# Patient Record
Sex: FEMALE | Race: WHITE | NOT HISPANIC OR LATINO | Employment: UNEMPLOYED
[De-identification: names, ages, dates, MRNs, and addresses within clinical notes are randomized per-mention and may not be internally consistent; named-entity substitution may affect disease eponyms.]

---

## 2017-05-26 ENCOUNTER — HEALTH MAINTENANCE LETTER (OUTPATIENT)
Age: 32
End: 2017-05-26

## 2023-12-05 ENCOUNTER — APPOINTMENT (OUTPATIENT)
Dept: CT IMAGING | Facility: CLINIC | Age: 38
End: 2023-12-05
Attending: PHYSICIAN ASSISTANT
Payer: COMMERCIAL

## 2023-12-05 ENCOUNTER — APPOINTMENT (OUTPATIENT)
Dept: ULTRASOUND IMAGING | Facility: CLINIC | Age: 38
End: 2023-12-05
Attending: PHYSICIAN ASSISTANT
Payer: COMMERCIAL

## 2023-12-05 ENCOUNTER — HOSPITAL ENCOUNTER (EMERGENCY)
Facility: CLINIC | Age: 38
Discharge: HOME | End: 2023-12-05
Attending: EMERGENCY MEDICINE | Admitting: EMERGENCY MEDICINE
Payer: COMMERCIAL

## 2023-12-05 VITALS
DIASTOLIC BLOOD PRESSURE: 57 MMHG | RESPIRATION RATE: 16 BRPM | HEART RATE: 62 BPM | OXYGEN SATURATION: 100 % | TEMPERATURE: 98.3 F | SYSTOLIC BLOOD PRESSURE: 105 MMHG

## 2023-12-05 DIAGNOSIS — R82.81 PYURIA: ICD-10-CM

## 2023-12-05 DIAGNOSIS — R10.9 ABDOMINAL PAIN, UNSPECIFIED ABDOMINAL LOCATION: ICD-10-CM

## 2023-12-05 LAB
ALBUMIN SERPL BCG-MCNC: 3.9 G/DL (ref 3.5–5.2)
ALBUMIN UR-MCNC: 30 MG/DL
ALP SERPL-CCNC: 53 U/L (ref 40–150)
ALT SERPL W P-5'-P-CCNC: 10 U/L (ref 0–50)
ANION GAP SERPL CALCULATED.3IONS-SCNC: 7 MMOL/L (ref 7–15)
APPEARANCE UR: CLEAR
AST SERPL W P-5'-P-CCNC: 15 U/L (ref 0–45)
BASOPHILS # BLD AUTO: 0.1 10E3/UL (ref 0–0.2)
BASOPHILS NFR BLD AUTO: 1 %
BILIRUB SERPL-MCNC: 0.2 MG/DL
BILIRUB UR QL STRIP: NEGATIVE
BUN SERPL-MCNC: 10.7 MG/DL (ref 6–20)
CALCIUM SERPL-MCNC: 8.5 MG/DL (ref 8.6–10)
CHLORIDE SERPL-SCNC: 101 MMOL/L (ref 98–107)
CLUE CELLS: ABNORMAL
COLOR UR AUTO: ABNORMAL
CREAT SERPL-MCNC: 0.68 MG/DL (ref 0.51–0.95)
DEPRECATED HCO3 PLAS-SCNC: 28 MMOL/L (ref 22–29)
EGFRCR SERPLBLD CKD-EPI 2021: >90 ML/MIN/1.73M2
EOSINOPHIL # BLD AUTO: 0 10E3/UL (ref 0–0.7)
EOSINOPHIL NFR BLD AUTO: 1 %
ERYTHROCYTE [DISTWIDTH] IN BLOOD BY AUTOMATED COUNT: 17 % (ref 10–15)
GLUCOSE SERPL-MCNC: 87 MG/DL (ref 70–99)
GLUCOSE UR STRIP-MCNC: NEGATIVE MG/DL
HCG INTACT+B SERPL-ACNC: <1 MIU/ML
HCT VFR BLD AUTO: 33.9 % (ref 35–47)
HGB BLD-MCNC: 10.4 G/DL (ref 11.7–15.7)
HGB UR QL STRIP: ABNORMAL
IMM GRANULOCYTES # BLD: 0 10E3/UL
IMM GRANULOCYTES NFR BLD: 0 %
KETONES UR STRIP-MCNC: 10 MG/DL
LEUKOCYTE ESTERASE UR QL STRIP: ABNORMAL
LIPASE SERPL-CCNC: 30 U/L (ref 13–60)
LYMPHOCYTES # BLD AUTO: 1.3 10E3/UL (ref 0.8–5.3)
LYMPHOCYTES NFR BLD AUTO: 22 %
MCH RBC QN AUTO: 26.5 PG (ref 26.5–33)
MCHC RBC AUTO-ENTMCNC: 30.7 G/DL (ref 31.5–36.5)
MCV RBC AUTO: 86 FL (ref 78–100)
MONOCYTES # BLD AUTO: 0.4 10E3/UL (ref 0–1.3)
MONOCYTES NFR BLD AUTO: 7 %
MUCOUS THREADS #/AREA URNS LPF: PRESENT /LPF
NEUTROPHILS # BLD AUTO: 4.2 10E3/UL (ref 1.6–8.3)
NEUTROPHILS NFR BLD AUTO: 69 %
NITRATE UR QL: NEGATIVE
NRBC # BLD AUTO: 0 10E3/UL
NRBC BLD AUTO-RTO: 0 /100
PH UR STRIP: 6 [PH] (ref 5–7)
PLATELET # BLD AUTO: 328 10E3/UL (ref 150–450)
POTASSIUM SERPL-SCNC: 3.5 MMOL/L (ref 3.4–5.3)
PROT SERPL-MCNC: 6.2 G/DL (ref 6.4–8.3)
RBC # BLD AUTO: 3.93 10E6/UL (ref 3.8–5.2)
RBC URINE: 3 /HPF
SODIUM SERPL-SCNC: 136 MMOL/L (ref 135–145)
SP GR UR STRIP: 1.03 (ref 1–1.03)
SQUAMOUS EPITHELIAL: <1 /HPF
TRANSITIONAL EPI: <1 /HPF
TRICHOMONAS, WET PREP: ABNORMAL
UROBILINOGEN UR STRIP-MCNC: NORMAL MG/DL
WBC # BLD AUTO: 6 10E3/UL (ref 4–11)
WBC URINE: 83 /HPF
WBC'S/HIGH POWER FIELD, WET PREP: ABNORMAL
YEAST, WET PREP: ABNORMAL

## 2023-12-05 PROCEDURE — 74177 CT ABD & PELVIS W/CONTRAST: CPT

## 2023-12-05 PROCEDURE — 87591 N.GONORRHOEAE DNA AMP PROB: CPT | Performed by: PHYSICIAN ASSISTANT

## 2023-12-05 PROCEDURE — 81001 URINALYSIS AUTO W/SCOPE: CPT | Performed by: PHYSICIAN ASSISTANT

## 2023-12-05 PROCEDURE — 87210 SMEAR WET MOUNT SALINE/INK: CPT | Performed by: PHYSICIAN ASSISTANT

## 2023-12-05 PROCEDURE — 250N000009 HC RX 250: Performed by: EMERGENCY MEDICINE

## 2023-12-05 PROCEDURE — 99285 EMERGENCY DEPT VISIT HI MDM: CPT | Mod: 25 | Performed by: EMERGENCY MEDICINE

## 2023-12-05 PROCEDURE — 250N000011 HC RX IP 250 OP 636: Mod: JZ | Performed by: EMERGENCY MEDICINE

## 2023-12-05 PROCEDURE — 87491 CHLMYD TRACH DNA AMP PROBE: CPT | Performed by: PHYSICIAN ASSISTANT

## 2023-12-05 PROCEDURE — 87086 URINE CULTURE/COLONY COUNT: CPT | Performed by: PHYSICIAN ASSISTANT

## 2023-12-05 PROCEDURE — 76830 TRANSVAGINAL US NON-OB: CPT

## 2023-12-05 PROCEDURE — 84702 CHORIONIC GONADOTROPIN TEST: CPT | Performed by: PHYSICIAN ASSISTANT

## 2023-12-05 PROCEDURE — 99284 EMERGENCY DEPT VISIT MOD MDM: CPT | Performed by: EMERGENCY MEDICINE

## 2023-12-05 PROCEDURE — 36415 COLL VENOUS BLD VENIPUNCTURE: CPT | Performed by: PHYSICIAN ASSISTANT

## 2023-12-05 PROCEDURE — 85025 COMPLETE CBC W/AUTO DIFF WBC: CPT | Performed by: PHYSICIAN ASSISTANT

## 2023-12-05 PROCEDURE — 83690 ASSAY OF LIPASE: CPT | Performed by: PHYSICIAN ASSISTANT

## 2023-12-05 PROCEDURE — 250N000013 HC RX MED GY IP 250 OP 250 PS 637: Performed by: PHYSICIAN ASSISTANT

## 2023-12-05 PROCEDURE — 80053 COMPREHEN METABOLIC PANEL: CPT | Performed by: PHYSICIAN ASSISTANT

## 2023-12-05 RX ORDER — ACETAMINOPHEN 500 MG
1000 TABLET ORAL ONCE
Status: COMPLETED | OUTPATIENT
Start: 2023-12-05 | End: 2023-12-05

## 2023-12-05 RX ORDER — IOPAMIDOL 755 MG/ML
100 INJECTION, SOLUTION INTRAVASCULAR ONCE
Status: COMPLETED | OUTPATIENT
Start: 2023-12-05 | End: 2023-12-05

## 2023-12-05 RX ORDER — OXYCODONE HYDROCHLORIDE 5 MG/1
5 TABLET ORAL ONCE
Status: COMPLETED | OUTPATIENT
Start: 2023-12-05 | End: 2023-12-05

## 2023-12-05 RX ORDER — ONDANSETRON 4 MG/1
4 TABLET, ORALLY DISINTEGRATING ORAL EVERY 8 HOURS PRN
Qty: 10 TABLET | Refills: 0 | Status: SHIPPED | OUTPATIENT
Start: 2023-12-05 | End: 2023-12-08

## 2023-12-05 RX ADMIN — SODIUM CHLORIDE 38 ML: 9 INJECTION, SOLUTION INTRAVENOUS at 22:23

## 2023-12-05 RX ADMIN — OXYCODONE HYDROCHLORIDE 5 MG: 5 TABLET ORAL at 21:35

## 2023-12-05 RX ADMIN — ACETAMINOPHEN 1000 MG: 500 TABLET ORAL at 18:48

## 2023-12-05 RX ADMIN — IOPAMIDOL 78 ML: 755 INJECTION, SOLUTION INTRAVENOUS at 22:25

## 2023-12-05 ASSESSMENT — ACTIVITIES OF DAILY LIVING (ADL)
ADLS_ACUITY_SCORE: 35
ADLS_ACUITY_SCORE: 33
ADLS_ACUITY_SCORE: 35
ADLS_ACUITY_SCORE: 35

## 2023-12-05 NOTE — ED PROVIDER NOTES
ED Provider Note  [facility]      History     Chief Complaint   Patient presents with    Abdominal Pain     Ongoing abdominal pain, radiating to lower back, worse today, irregular periods, nausea and vomiting, weakness and fatigue.     HPI  37yo F pmhx  Bipolar personality disorder, PTSD, Bipolar 1, Depression, Fibromyalgia, s/p Kerri-en-Y  p/w ongoing abdominal pain x months  Low abdominal pain radiates to her low back, and into her vagina.  Associated with mild nausea, but no vomiting.  Denies fever or chills.  Currently on menses.  No vaginal discharge, urinary symptoms, diarrhea.  Notably patient saw her PCP for the same symptoms within the last week.  Had UA that was negative.  Has outpatient pelvic sono pending. Outpt notes indicate pt has chronic history of dysmenorrhea and fibromyalgia.  Additionally, patient had ED visits in the middle of November, had a Noncon CT of her abdomen pelvis at that time which was grossly unremarkable, save for noted gastric bypass and cholecystectomy.  Additionally, has completed a course of metronidazole nitrofurantoin within the last month for UTI and BV.      Past Medical History  Past Medical History:   Diagnosis Date    Bipolar disorder, unspecified (H)     Manic-depressive    Obsessive-compulsive disorders     Pneumonia, organism unspecified(486) 1993    Pneumonia/ hospitalized    Profound impairment, one eye, impairment level not further specified     amblyopia    Unspecified asthma(493.90)     Asthma     Past Surgical History:   Procedure Laterality Date    ABDOMEN SURGERY      CHOLECYSTECTOMY      HC REMOVE TONSILS/ADENOIDS,<10 Y/O      T & A <11y.o.    PA LAP GASTRIC BYPASS/KERRI-EN-Y      ZZHC CREATE EARDRUM OPENING,GEN ANESTH      P.E. Tubes     ondansetron (ZOFRAN ODT) 4 MG ODT tab  PAXIL 20 MG OR TABS  VENTOLIN 90 MCG/ACT IN AERS      Allergies   Allergen Reactions    No Known Allergies     Nsaids GI Disturbance     Family History  Family  History   Problem Relation Age of Onset    Alcohol/Drug Mother         recovering    Alcohol/Drug Father         recovering    Allergies Brother         hayfever    Allergies Mother     Anesthesia Reaction No family hx of     Depression Mother     Depression Father     Depression Brother     Depression Brother     Diabetes Mother     Diabetes Father     Genitourinary Problems Mother         kidney stones    Respiratory Mother         asthma    Thyroid Disease Mother      Social History   Social History     Tobacco Use    Smoking status: Every Day     Packs/day: 0.25     Years: 4.00     Additional pack years: 0.00     Total pack years: 1.00     Types: Cigarettes    Tobacco comments:     3/day   Substance Use Topics    Alcohol use: No     Comment: prior to pregnancy    Drug use: No     Comment: previously used THC, crank, meth, cocaine         A medically appropriate review of systems was performed with pertinent positives and negatives noted in the HPI, and all other systems negative.    Physical Exam   BP: 111/78  Pulse: 90  Temp: 98  F (36.7  C)  Resp: 16  SpO2: 100 %  Physical Exam  Constitutional:       General: She is not in acute distress.     Appearance: Normal appearance. She is well-developed.   HENT:      Head: Normocephalic and atraumatic.   Eyes:      General: No scleral icterus.     Conjunctiva/sclera: Conjunctivae normal.   Cardiovascular:      Rate and Rhythm: Normal rate.   Pulmonary:      Effort: Pulmonary effort is normal. No respiratory distress.   Abdominal:      General: Abdomen is flat.      Palpations: Abdomen is soft.      Tenderness: There is generalized abdominal tenderness.   Genitourinary:     Vagina: Normal. No vaginal discharge.      Cervix: Normal. No cervical motion tenderness.      Uterus: Normal.       Adnexa: Right adnexa normal and left adnexa normal.      Comments: Harpreet Escobar chaperone  Musculoskeletal:      Cervical back: Normal range of motion and neck supple.   Skin:      General: Skin is warm and dry.      Findings: No rash.   Neurological:      Mental Status: She is alert and oriented to person, place, and time.           ED Course, Procedures, & Data                          Results for orders placed or performed during the hospital encounter of 12/05/23   US Pelvic Complete with Transvaginal     Status: None    Narrative    EXAM: US PELVIC TRANSABDOMINAL AND TRANSVAGINAL  LOCATION: [facility]  DATE: 12/5/2023    INDICATION: Pelvic pain. Assess fibroids vs ovarian cyst  COMPARISON: None.  TECHNIQUE: Transabdominal scans were performed. Endovaginal ultrasound was performed to better visualize the adnexa.    FINDINGS:    UTERUS: 6.9 x 3.8 x 4.8 cm. Normal in size and position with no masses. Nabothian cyst noted.    ENDOMETRIUM: 3 mm. Normal smooth endometrium.    RIGHT OVARY: 1.8 x 1.6 x 3.1 cm. Normal.    LEFT OVARY: 1.7 x 2.3 x 1.7 cm. Normal.    No significant free fluid.      Impression    IMPRESSION:  Normal pelvic ultrasound.   CT Abdomen Pelvis w Contrast     Status: None    Narrative    EXAM: CT ABDOMEN PELVIS W CONTRAST  LOCATION: [facility]  DATE: 12/5/2023    INDICATION: Lower abdominal pain. Evaluate for appendicitis.  COMPARISON: None.  TECHNIQUE: CT scan of the abdomen and pelvis was performed following injection of IV contrast. Multiplanar reformats were obtained. Dose reduction techniques were used.  CONTRAST: 78mL Isovue 370    FINDINGS:   LOWER CHEST: Unremarkable.    HEPATOBILIARY: Liver appears normal. Status post cholecystectomy. Mild common duct dilatation and central intrahepatic biliary dilatation, likely postcholecystectomy reservoir effect.    PANCREAS: Normal.    SPLEEN: Normal.    ADRENAL GLANDS: Normal.    KIDNEYS/BLADDER: Normal.    BOWEL: Status post gastric bypass. No bowel obstruction or evidence of bowel inflammation. Normal appendix. No evidence of acute  appendicitis.    LYMPH NODES: No lymphadenopathy.    VASCULATURE: Unremarkable.    PELVIC ORGANS: Unremarkable.    MUSCULOSKELETAL: Mild multilevel degenerative changes of the spine. Multilevel Schmorl's nodes.      Impression    IMPRESSION:   1.  No acute findings or inflammatory changes in the abdomen or pelvis. No acute appendicitis.   Comprehensive metabolic panel     Status: Abnormal   Result Value Ref Range    Sodium 136 135 - 145 mmol/L    Potassium 3.5 3.4 - 5.3 mmol/L    Carbon Dioxide (CO2) 28 22 - 29 mmol/L    Anion Gap 7 7 - 15 mmol/L    Urea Nitrogen 10.7 6.0 - 20.0 mg/dL    Creatinine 0.68 0.51 - 0.95 mg/dL    GFR Estimate >90 >60 mL/min/1.73m2    Calcium 8.5 (L) 8.6 - 10.0 mg/dL    Chloride 101 98 - 107 mmol/L    Glucose 87 70 - 99 mg/dL    Alkaline Phosphatase 53 40 - 150 U/L    AST 15 0 - 45 U/L    ALT 10 0 - 50 U/L    Protein Total 6.2 (L) 6.4 - 8.3 g/dL    Albumin 3.9 3.5 - 5.2 g/dL    Bilirubin Total 0.2 <=1.2 mg/dL   Lipase     Status: Normal   Result Value Ref Range    Lipase 30 13 - 60 U/L   UA with Microscopic reflex to Culture     Status: Abnormal    Specimen: Urine, Midstream   Result Value Ref Range    Color Urine Light Yellow Colorless, Straw, Light Yellow, Yellow    Appearance Urine Clear Clear    Glucose Urine Negative Negative mg/dL    Bilirubin Urine Negative Negative    Ketones Urine 10 (A) Negative mg/dL    Specific Gravity Urine 1.029 1.003 - 1.035    Blood Urine Small (A) Negative    pH Urine 6.0 5.0 - 7.0    Protein Albumin Urine 30 (A) Negative mg/dL    Urobilinogen Urine Normal Normal, 2.0 mg/dL    Nitrite Urine Negative Negative    Leukocyte Esterase Urine Moderate (A) Negative    Mucus Urine Present (A) None Seen /LPF    RBC Urine 3 (H) <=2 /HPF    WBC Urine 83 (H) <=5 /HPF    Squamous Epithelials Urine <1 <=1 /HPF    Transitional Epithelials Urine <1 <=1 /HPF    Narrative    Urine Culture ordered based on laboratory criteria   CBC with platelets and differential     Status:  Abnormal   Result Value Ref Range    WBC Count 6.0 4.0 - 11.0 10e3/uL    RBC Count 3.93 3.80 - 5.20 10e6/uL    Hemoglobin 10.4 (L) 11.7 - 15.7 g/dL    Hematocrit 33.9 (L) 35.0 - 47.0 %    MCV 86 78 - 100 fL    MCH 26.5 26.5 - 33.0 pg    MCHC 30.7 (L) 31.5 - 36.5 g/dL    RDW 17.0 (H) 10.0 - 15.0 %    Platelet Count 328 150 - 450 10e3/uL    % Neutrophils 69 %    % Lymphocytes 22 %    % Monocytes 7 %    % Eosinophils 1 %    % Basophils 1 %    % Immature Granulocytes 0 %    NRBCs per 100 WBC 0 <1 /100    Absolute Neutrophils 4.2 1.6 - 8.3 10e3/uL    Absolute Lymphocytes 1.3 0.8 - 5.3 10e3/uL    Absolute Monocytes 0.4 0.0 - 1.3 10e3/uL    Absolute Eosinophils 0.0 0.0 - 0.7 10e3/uL    Absolute Basophils 0.1 0.0 - 0.2 10e3/uL    Absolute Immature Granulocytes 0.0 <=0.4 10e3/uL    Absolute NRBCs 0.0 10e3/uL   HCG quantitative pregnancy (blood)     Status: Normal   Result Value Ref Range    hCG Quantitative <1 <5 mIU/mL   Wet prep     Status: Abnormal    Specimen: Vagina; Swab   Result Value Ref Range    Trichomonas Absent Absent    Yeast Absent Absent    Clue Cells Absent Absent    WBCs/high power field 2+ (A) None   Chlamydia trachomatis/Neisseria gonorrhoeae by PCR     Status: Normal    Specimen: Vagina; Swab   Result Value Ref Range    Chlamydia Trachomatis Negative Negative    Neisseria gonorrhoeae Negative Negative   Urine Culture     Status: Abnormal (Preliminary result)    Specimen: Urine, Midstream   Result Value Ref Range    Culture 50,000-100,000 CFU/mL Escherichia coli (A)    CBC with platelets differential     Status: Abnormal    Narrative    The following orders were created for panel order CBC with platelets differential.  Procedure                               Abnormality         Status                     ---------                               -----------         ------                     CBC with platelets and d...[[id number]]  Abnormal            Final result                 Please view results for these  tests on the individual orders.     Medications   acetaminophen (TYLENOL) tablet 1,000 mg (1,000 mg Oral $Given 12/5/23 1848)   oxyCODONE (ROXICODONE) tablet 5 mg (5 mg Oral $Given 12/5/23 2135)   iopamidol (ISOVUE-370) solution 100 mL (78 mLs Intravenous $Given 12/5/23 2225)   sodium chloride 0.9 % bag 100mL for CT scan flush use (38 mLs Intravenous $Given 12/5/23 2223)     Labs Ordered and Resulted from Time of ED Arrival to Time of ED Departure   COMPREHENSIVE METABOLIC PANEL - Abnormal       Result Value    Sodium 136      Potassium 3.5      Carbon Dioxide (CO2) 28      Anion Gap 7      Urea Nitrogen 10.7      Creatinine 0.68      GFR Estimate >90      Calcium 8.5 (*)     Chloride 101      Glucose 87      Alkaline Phosphatase 53      AST 15      ALT 10      Protein Total 6.2 (*)     Albumin 3.9      Bilirubin Total 0.2     ROUTINE UA WITH MICROSCOPIC REFLEX TO CULTURE - Abnormal    Color Urine Light Yellow      Appearance Urine Clear      Glucose Urine Negative      Bilirubin Urine Negative      Ketones Urine 10 (*)     Specific Gravity Urine 1.029      Blood Urine Small (*)     pH Urine 6.0      Protein Albumin Urine 30 (*)     Urobilinogen Urine Normal      Nitrite Urine Negative      Leukocyte Esterase Urine Moderate (*)     Mucus Urine Present (*)     RBC Urine 3 (*)     WBC Urine 83 (*)     Squamous Epithelials Urine <1      Transitional Epithelials Urine <1     CBC WITH PLATELETS AND DIFFERENTIAL - Abnormal    WBC Count 6.0      RBC Count 3.93      Hemoglobin 10.4 (*)     Hematocrit 33.9 (*)     MCV 86      MCH 26.5      MCHC 30.7 (*)     RDW 17.0 (*)     Platelet Count 328      % Neutrophils 69      % Lymphocytes 22      % Monocytes 7      % Eosinophils 1      % Basophils 1      % Immature Granulocytes 0      NRBCs per 100 WBC 0      Absolute Neutrophils 4.2      Absolute Lymphocytes 1.3      Absolute Monocytes 0.4      Absolute Eosinophils 0.0      Absolute Basophils 0.1      Absolute Immature  Granulocytes 0.0      Absolute NRBCs 0.0     WET PREPARATION - Abnormal    Trichomonas Absent      Yeast Absent      Clue Cells Absent      WBCs/high power field 2+ (*)    LIPASE - Normal    Lipase 30     HCG QUANTITATIVE PREGNANCY - Normal    hCG Quantitative <1       CT Abdomen Pelvis w Contrast   Final Result   IMPRESSION:    1.  No acute findings or inflammatory changes in the abdomen or pelvis. No acute appendicitis.      US Pelvic Complete with Transvaginal   Final Result   IMPRESSION:   Normal pelvic ultrasound.             Critical care was not performed.     Medical Decision Making  The patient's presentation was of moderate complexity (a chronic illness mild to moderate exacerbation, progression, or side effect of treatment).    The patient's evaluation involved:  review of external note(s) from 3+ sources (clinic and ED notes)  review of 2 test result(s) ordered prior to this encounter (labs and CT)  ordering and/or review of 3+ test(s) in this encounter (labs and CT and sono)    The patient's management necessitated moderate risk (limitations due to social determinants of health (poor health literacy)).    Assessment & Plan    37yo F pmhx  Bipolar personality disorder, PTSD, Bipolar 1, Depression, Fibromyalgia, s/p Kerri-en-Y  p/w ongoing abdominal pain x months  Low abdominal pain radiates to her low back, and into her vagina.  Associated with mild nausea, but no vomiting.  Denies fever or chills.  Currently on menses.  No vaginal discharge, urinary symptoms, diarrhea.  Notably patient saw her PCP for the same symptoms within the last week.  Had UA that was negative.  Has outpatient pelvic sono pending. Outpt notes indicate pt has chronic history of dysmenorrhea and fibromyalgia.  Additionally, patient had ED visits in the middle of November, had a Noncon CT of her abdomen pelvis at that time which was grossly unremarkable, save for noted gastric bypass and cholecystectomy.  Additionally, has completed a  course of metronidazole nitrofurantoin within the last month for UTI and BV.    --    ED Attending Physician Attestation    I TREVER GUTIERREZ MD, MD, cared for this patient with the Advanced Practice Provider (CATINA). I have performed a history and physical examination of the patient independent of the [facility]INA. I reviewed the [facility]'s documentation above and agree with the documented findings and plan of care. I personally provided a substantive portion of the care for this patient, including the complete Medical Decision Making. Please see the [facility]'s documentation for full details.    Summary of HPI, PE, ED Course   Patient is a 38 year old female evaluated in the emergency department for abdominal pain. Exam and ED course notable for heart regular rate and rhythm.  Lungs good auscultation bilaterally.  Abdomen soft and generally tender without focal guarding and no rebound.  Diagnostic testing remarkable only for pyuria.  Imaging including ultrasound and CT did not reveal any acute intra-abdominal pathology.  Patient does not have UTI symptoms and does not wish to initiate antibiotic treatment at this time.  Will culture.  This abdominal pain is become an ongoing issue, recommendation for primary care follow-up for ongoing diagnostic evaluation and management of symptoms.  After the completion of care in the emergency department, the patient was discharged.        I have reviewed the nursing notes. I have reviewed the findings, diagnosis, plan and need for follow up with the patient.    ED Course as of 12/06/23 2130 Tue Dec 05, 2023   2040 Lipase: 30   2040 Comprehensive metabolic panel(!)  Unremarkable   2040 Wet prep(!)  Negative   2041 CBC with platelets differential(!)  Noncontributory   2041 UA with Microscopic reflex to Culture(!)  Large WBCs and moderate LE.  No nitrites.  No urinary symptoms.  Will send for culture. But will also obtain CT a/p r/o acute appy after discussion with Dr. Gutierrez.    2041 US Pelvic Complete  with Transvaginal  Negative.   2200 At time of completion of my shift, pt's CT was pending. Please see Dr. Titus's attestation for final ED course, diagnosis and disposition.          Discharge Medication List as of 12/5/2023 11:26 PM        START taking these medications    Details   ondansetron (ZOFRAN ODT) 4 MG ODT tab Take 1 tablet (4 mg) by mouth every 8 hours as needed for nausea, Disp-10 tablet, R-0, Local Print             Final diagnoses:   Abdominal pain, unspecified abdominal location   Pyuria         Kiko Bailey PA-C  [facility] EMERGENCY DEPARTMENT  12/5/2023     Kiko Bailey PA-C  12/06/23 2139       Kalen Titus MD  12/07/23 1766

## 2023-12-05 NOTE — ED NOTES
Patient does not want to discuss this in the mueller with any other members of the care team    Patient has been having abdominal pain on and off for the past few months.  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  PT states her pain has gone from intermittent to continues with sharp and twisting sensations.

## 2023-12-06 ENCOUNTER — TELEPHONE (OUTPATIENT)
Dept: EMERGENCY MEDICINE | Facility: CLINIC | Age: 38
End: 2023-12-06
Payer: COMMERCIAL

## 2023-12-06 LAB
BACTERIA UR CULT: ABNORMAL
C TRACH DNA SPEC QL PROBE+SIG AMP: NEGATIVE
N GONORRHOEA DNA SPEC QL NAA+PROBE: NEGATIVE

## 2023-12-06 NOTE — TELEPHONE ENCOUNTER
[facility] Emergency Department/Urgent Care Lab result notification  [Note:  ED Lab Results RN will reference the [facility] Emergency Dept visit note prior to contacting patient AND/OR prior to consulting Emergency Dept Provider.  Highlights of Emergency Dept visit in information summary at the bottom of this telephone note]    1. Reason for call  Notify of lab results  Assess patient symptoms [if necessary]  Review ED Providers recommendations/discharge instructions (if necessary)  Advise per [facility] ED lab result protocol    2. Lab Result (including Rx patient on, if applicable).  If culture, copy of lab report at bottom.  Preliminary urine culture report on 12/06/23 shows the presence of bacteria(s):  50,000 - 100,000 CFU/ML Escherichia coli  Emergency Dept/Urgent Care discharge antibiotic: None  Recommendations per [facility] ED Lab result Urine culture protocol.    3. RN Assessment (Patient's current Symptoms):  Time of call: 1455 left message.     4. RN Recommendations/Instructions per [place] ED lab result protocol  [facility] ED lab result protocol used: Urine culture  Left voicemail message requesting a call back to [facility] ED Lab Result RN at [phone].  RN is available every day between 9 a.m. and 5:30 p.m  Plan to treat with Macrobid if symptomatic.    Information summary from Emergency Dept/Urgent Care visit on 12/05/23  Symptoms reported at ED/UC visit (Chief complaint, HPI)      Chief Complaint   Patient presents with    Abdominal Pain       Ongoing abdominal pain, radiating to lower back, worse today, irregular periods, nausea and vomiting, weakness and fatigue.      HPI  39yo F pmhx  Bipolar personality disorder, PTSD, Bipolar 1, Depression, Fibromyalgia, s/p Kerri-en-Y who presents p/w ongoing abdominal pain.  States the symptoms have been ongoing for many months.  But she feels that they are now more constant.  Low abdominal pain radiates  to her low back, and into her vagina.  Is associated with mild nausea, but no vomiting.  Denies fever or chills.  Currently on her menses.  No vaginal discharge, urinary symptoms, diarrhea.  Notably patient saw her PCP for the same symptoms within the last week.  Had UA that was negative.  Has outpatient pelvic CT pending notes indicate the patient has chronic history of dysmenorrhea and fibromyalgia.  Additionally, patient had ED visits in the middle of November, had a Noncon CT of her abdomen pelvis at that time which was grossly unremarkable, save for noted gastric bypass and cholecystectomy.  Additionally, has completed a course of metronidazole nitrofurantoin within the last month for UTI and BV.   Significant Medical hx, if applicable (i.e. CKD, diabetes) Reviewed   Allergies Allergies   Allergen Reactions    No Known Allergies     Nsaids GI Disturbance      Weight, if applicable Wt Readings from Last 2 Encounters:   09/24/03 71.7 kg (158 lb) (87%, Z= 1.14)*   05/20/03 73 kg (161 lb) (89%, Z= 1.24)*     * Growth percentiles are based on CDC (Girls, 2-20 Years) data.      Coumadin/Warfarin [Yes /No] No   Creatinine Level (mg/dl) Creatinine   Date Value Ref Range Status   12/05/2023 0.68 0.51 - 0.95 mg/dL Final      Creatinine clearance (ml/min), if applicable Creatinine clearance cannot be calculated (Unknown ideal weight.)   Pregnant (Yes/No/NA) No, per ED lab   Breastfeeding (Yes/No/NA) ?   ED/UC Provider Impression and Plan (applicable information) Incomplete note.    ED/UC diagnosis Abdominal pain, unspecified abdominal location    Pyuria   ED/UC Provider Kalen Titus MD         Copy of Lab report (if applicable)        Ac Damon RN  [facility]  Emergency Dept Lab Result RN  Ph# [phone]

## 2023-12-06 NOTE — DISCHARGE INSTRUCTIONS
Follow-up with your primary care clinic this week.  Take ondansetron as needed for nausea.  Take acetaminophen as needed for pain.    Return if fever or other concerns.

## 2023-12-07 ENCOUNTER — TELEPHONE (OUTPATIENT)
Dept: EMERGENCY MEDICINE | Facility: CLINIC | Age: 38
End: 2023-12-07
Payer: COMMERCIAL

## 2023-12-07 RX ORDER — CEFDINIR 300 MG/1
300 CAPSULE ORAL 2 TIMES DAILY
Qty: 10 CAPSULE | Refills: 0 | Status: SHIPPED | OUTPATIENT
Start: 2023-12-07 | End: 2023-12-12

## 2023-12-07 NOTE — RESULT ENCOUNTER NOTE
Yamini was notified of lab result and treatment recommendations  Start or switch to Rx for Cefdinir (Omnicef) 300 mg capsule, 1 capsule (300 mg) by mouth 2 times daily for 5 days  [facility]

## 2023-12-07 NOTE — RESULT ENCOUNTER NOTE
Final urine culture on 12/6/23 shows the presence of bacteria(s): 50,000 - 100,000 CFU/ML Escherichia coli   [facility] Emergency Dept discharge antibiotic: None  Recommendations in treatment per [facility] ED lab result Urine Culture protocol.

## 2023-12-07 NOTE — TELEPHONE ENCOUNTER
[facility] Emergency Department/Urgent Care Lab result notification  [Note:  ED Lab Results RN will reference the [facility] Emergency Dept visit note prior to contacting patient AND/OR prior to consulting Emergency Dept Provider.  Highlights of Emergency Dept visit in information summary at the bottom of this telephone note]    1. Reason for call  Notify of lab results  Assess patient symptoms [if necessary]  Review ED Providers recommendations/discharge instructions (if necessary)  Advise per [facility] ED lab result protocol    2. Lab Result (including Rx patient on, if applicable).  If culture, copy of lab report at bottom.  Final urine culture on 12/6/23 shows the presence of bacteria(s): 50,000 - 100,000 CFU/ML Escherichia coli  [facility] Emergency Dept discharge antibiotic: None  Recommendations in treatment per [facility] ED lab result Urine Culture protocol.    3. RN Assessment (Patient's current Symptoms):  Time of call: 12;15P  Assessment:  frequency;  pressure in the bladder region and with urination.  Doesn't feel like she is able to empty her bladder.  Still having the lower abdominal pain.  No fever.  Feels weak and shaky.  She states she has taken Macrobid and Keflex recently for treating a UTI    4. RN Recommendations/Instructions per [place] ED lab result protocol  [facility] ED lab result protocol used: urine culture  Ymaini was notified of lab result and treatment recommendations  Start or switch to Rx for Cefdinir (Omnicef) 300 mg capsule, 1 capsule (300 mg) by mouth 2 times daily for 5 days  sent to [Pharmacy - Capsule, [facility]].    RN reviewed information about UTI prevention    5. Please Contact your PCP clinic or return to the Emergency department if your:  Symptoms do not improve after 3 days on antibiotic.  Symptoms do not resolve after completing antibiotic.  Symptoms worsen or other concerning symptoms.    Information summary from Emergency  Dept/Urgent Care visit on 12/5/23  Symptoms reported at ED/UC visit (Chief complaint, HPI) Chief Complaint   Patient presents with    Abdominal Pain       Ongoing abdominal pain, radiating to lower back, worse today, irregular periods, nausea and vomiting, weakness and fatigue.      HPI  39yo F pmhx  Bipolar personality disorder, PTSD, Bipolar 1, Depression, Fibromyalgia, s/p Kerri-en-Y  p/w ongoing abdominal pain x months  Low abdominal pain radiates to her low back, and into her vagina.  Associated with mild nausea, but no vomiting.  Denies fever or chills.  Currently on menses.  No vaginal discharge, urinary symptoms, diarrhea.  Notably patient saw her PCP for the same symptoms within the last week.  Had UA that was negative.  Has outpatient pelvic sono pending. Outpt notes indicate pt has chronic history of dysmenorrhea and fibromyalgia.  Additionally, patient had ED visits in the middle of November, had a Noncon CT of her abdomen pelvis at that time which was grossly unremarkable, save for noted gastric bypass and cholecystectomy.  Additionally, has completed a course of metronidazole nitrofurantoin within the last month for UTI and BV.   Significant Medical hx, if applicable (i.e. CKD, diabetes) Reviewed   Allergies Allergies   Allergen Reactions    No Known Allergies     Nsaids GI Disturbance      Weight, if applicable Wt Readings from Last 2 Encounters:   09/24/03 71.7 kg (158 lb) (87%, Z= 1.14)*   05/20/03 73 kg (161 lb) (89%, Z= 1.24)*     * Growth percentiles are based on CDC (Girls, 2-20 Years) data.      Coumadin/Warfarin [Yes /No] No   Creatinine Level (mg/dl) Creatinine   Date Value Ref Range Status   12/05/2023 0.68 0.51 - 0.95 mg/dL Final      Creatinine clearance (ml/min), if applicable Creatinine clearance cannot be calculated (Unknown ideal weight.)   Pregnant (Yes/No/NA) HCG quant is <1   Breastfeeding (Yes/No/NA) NA   ED/UC Provider Impression and Plan (applicable information) Assessment & Plan     39yo F pmhx  Bipolar personality disorder, PTSD, Bipolar 1, Depression, Fibromyalgia, s/p Kerri-en-Y  p/w ongoing abdominal pain x months  Low abdominal pain radiates to her low back, and into her vagina.  Associated with mild nausea, but no vomiting.  Denies fever or chills.  Currently on menses.  No vaginal discharge, urinary symptoms, diarrhea.  Notably patient saw her PCP for the same symptoms within the last week.  Had UA that was negative.  Has outpatient pelvic sono pending. Outpt notes indicate pt has chronic history of dysmenorrhea and fibromyalgia.  Additionally, patient had ED visits in the middle of November, had a Noncon CT of her abdomen pelvis at that time which was grossly unremarkable, save for noted gastric bypass and cholecystectomy.  Additionally, has completed a course of metronidazole nitrofurantoin within the last month for UTI and BV.     --     ED Attending Physician Attestation     SERA GUTIERREZ MD, MD, cared for this patient with the Advanced Practice Provider ([facility]). I have performed a history and physical examination of the patient independent of the [facility]. I reviewed the [facility]'s documentation above and agree with the documented findings and plan of care. I personally provided a substantive portion of the care for this patient, including the complete Medical Decision Making. Please see the [facility]'s documentation for full details.     Summary of HPI, PE, ED Course   Patient is a 38 year old female evaluated in the emergency department for abdominal pain. Exam and ED course notable for heart regular rate and rhythm.  Lungs good auscultation bilaterally.  Abdomen soft and generally tender without focal guarding and no rebound.  Diagnostic testing remarkable only for pyuria.  Imaging including ultrasound and CT did not reveal any acute intra-abdominal pathology.  Patient does not have UTI symptoms and does not wish to initiate antibiotic treatment at this time.  Will culture.  This abdominal pain  is become an ongoing issue, recommendation for primary care follow-up for ongoing diagnostic evaluation and management of symptoms.  After the completion of care in the emergency department, the patient was discharged.   ED/UC diagnosis Final diagnoses:   Abdominal pain, unspecified abdominal location   Pyuria      ED/UC Provider Kiko Bailey PA-C  12/06/23 [id number]      Kalen Titus MD  12/07/23 [id number]        Copy of Lab report (if applicable)        Michael Delgado RN  [facility]  Emergency Dept Lab Result RN  Ph# [phone]